# Patient Record
Sex: FEMALE | Race: BLACK OR AFRICAN AMERICAN
[De-identification: names, ages, dates, MRNs, and addresses within clinical notes are randomized per-mention and may not be internally consistent; named-entity substitution may affect disease eponyms.]

---

## 2019-04-10 ENCOUNTER — HOSPITAL ENCOUNTER (EMERGENCY)
Dept: HOSPITAL 91 - FTE | Age: 19
Discharge: HOME | End: 2019-04-10
Payer: MEDICAID

## 2019-04-10 ENCOUNTER — HOSPITAL ENCOUNTER (EMERGENCY)
Dept: HOSPITAL 10 - FTE | Age: 19
Discharge: HOME | End: 2019-04-10
Payer: MEDICAID

## 2019-04-10 VITALS — RESPIRATION RATE: 20 BRPM | SYSTOLIC BLOOD PRESSURE: 149 MMHG | HEART RATE: 97 BPM | DIASTOLIC BLOOD PRESSURE: 70 MMHG

## 2019-04-10 VITALS
HEIGHT: 65 IN | HEIGHT: 65 IN | WEIGHT: 194.01 LBS | BODY MASS INDEX: 32.32 KG/M2 | WEIGHT: 194.01 LBS | BODY MASS INDEX: 32.32 KG/M2

## 2019-04-10 DIAGNOSIS — O23.592: Primary | ICD-10-CM

## 2019-04-10 DIAGNOSIS — N89.8: ICD-10-CM

## 2019-04-10 DIAGNOSIS — Z3A.14: ICD-10-CM

## 2019-04-10 LAB
ADD UMIC: NO
HEPATITIS B SURFACE ANTIBODY: NEGATIVE
HEPATITIS B SURFACE ANTIGEN: NEGATIVE
HEPATITIS C VIRAL ANTIBODY: NEGATIVE
HIV 1&2 ANTIBODY: NEGATIVE
RAPID PLASMA REAGIN: NONREACTIVE
UR ASCORBIC ACID: 40 MG/DL
UR BACTERIA: (no result) /HPF
UR BILIRUBIN (DIP): NEGATIVE MG/DL
UR BLOOD (DIP): NEGATIVE MG/DL
UR CLARITY: (no result)
UR COLOR: YELLOW
UR GLUCOSE (DIP): NEGATIVE MG/DL
UR KETONES (DIP): NEGATIVE MG/DL
UR LEUKOCYTE ESTERASE (DIP): NEGATIVE LEU/UL
UR MUCUS: (no result) /HPF
UR NITRITE (DIP): NEGATIVE MG/DL
UR PH (DIP): 6 (ref 5–9)
UR RBC: 2 /HPF (ref 0–5)
UR SPECIFIC GRAVITY (DIP): 1.03 (ref 1–1.03)
UR SQUAMOUS EPITHELIAL CELL: (no result) /HPF
UR TOTAL PROTEIN (DIP): NEGATIVE MG/DL
UR UROBILINOGEN (DIP): (no result) MG/DL
UR WBC: 3 /HPF (ref 0–5)

## 2019-04-10 PROCEDURE — 99284 EMERGENCY DEPT VISIT MOD MDM: CPT

## 2019-04-10 PROCEDURE — 84703 CHORIONIC GONADOTROPIN ASSAY: CPT

## 2019-04-10 PROCEDURE — 81001 URINALYSIS AUTO W/SCOPE: CPT

## 2019-04-10 PROCEDURE — 86592 SYPHILIS TEST NON-TREP QUAL: CPT

## 2019-04-10 PROCEDURE — 86706 HEP B SURFACE ANTIBODY: CPT

## 2019-04-10 PROCEDURE — 36415 COLL VENOUS BLD VENIPUNCTURE: CPT

## 2019-04-10 PROCEDURE — 87591 N.GONORRHOEAE DNA AMP PROB: CPT

## 2019-04-10 PROCEDURE — 87086 URINE CULTURE/COLONY COUNT: CPT

## 2019-04-10 PROCEDURE — 87220 TISSUE EXAM FOR FUNGI: CPT

## 2019-04-10 PROCEDURE — 81003 URINALYSIS AUTO W/O SCOPE: CPT

## 2019-04-10 PROCEDURE — 86703 HIV-1/HIV-2 1 RESULT ANTBDY: CPT

## 2019-04-10 PROCEDURE — 86803 HEPATITIS C AB TEST: CPT

## 2019-04-10 PROCEDURE — 81025 URINE PREGNANCY TEST: CPT

## 2019-04-10 PROCEDURE — 87340 HEPATITIS B SURFACE AG IA: CPT

## 2019-04-10 NOTE — ERD
ER Documentation


Chief Complaint


Chief Complaint





VAGINAL ITCHING AND DISCHARGE X 1 WEEK (14 weeks pregnant)





HPI


This is a 18-year-old G1 female at 14 weeks gestation who presents to the ED 


complaining of vaginal itching and discharge.  Patient states she was recently 


treated for BV by her midwife with metronidazole.  Patient states she continued 


to have some itching and discharge and has been using clotrimazole cream for 


this.  She states her symptoms are improved.  She is also requesting STD 


screening.  She denies any recent STD exposures.  Denies any urinary symptoms.  


Denies any abdominal pain or vaginal discharge or vaginal bleeding.  No other 


complaints.





ROS


All systems reviewed and are negative except as per history of present illness.





Allergies


Allergies:  


Coded Allergies:  


     No Known Allergy (Unverified , 4/10/19)





PMhx/Soc


Medical and Surgical Hx:  pt denies Medical Hx, pt denies Surgical Hx


Hx Alcohol Use:  No


Hx Substance Use:  No


Hx Tobacco Use:  No


Smoking Status:  Never smoker





Physical Exam


Vitals





Vital Signs


  Date      Temp  Pulse  Resp  B/P (MAP)   Pulse Ox  O2          O2 Flow    FiO2


Time                                                 Delivery    Rate


   4/10/19  97.3     97    20      149/70        99


     00:39                           (96)





Physical Exam


Const:   No acute distress


Head:   Atraumatic 


Eyes:    Normal Conjunctiva


ENT:    Normal External Ears, Nose and Mouth.


Neck:               Full range of motion. No meningismus.


Resp:   Clear to auscultation bilaterally


Cardio:   Regular rate and rhythm, no murmurs


Abd:    Soft, non tender, non distended. Normal bowel sounds


 Pelvic Exam: Chaperone present


 Abdomen:      Nontender


 External Genitalia:   Normal Skin


 Speculum:      Normal vaginal mucosa, + white cream in the vaginal vault, 


likely related to her clotrimazole cream and not discharge.


 Bimanual:       No adnexal masses or tenderness, No CMT


Skin:   No petechiae or rashes


Back:   No midline or flank tenderness


Ext:    No cyanosis, or edema


Neur:   Awake and alert


Psych:    Normal Mood and Affect


Results 24 hrs





Laboratory Tests


 Test
                            4/10/19
03:31    4/10/19
03:32  4/10/19
03:53


 Hepatitis B Surface Antigen      NEGATIVE


 Hepatitis B Surface Antibody     NEGATIVE


 Hepatitis C Antibody             NEGATIVE


 HIV (1&2) Antibody               NEGATIVE


 Urine Color                                     YELLOW


 Urine Clarity
                   
              SLIGHTLY
CLOUDY  



 Urine pH                                                   6.0


 Urine Specific Gravity                                   1.028


 Urine Ketones                                   NEGATIVE mg/dL


 Urine Nitrite                                   NEGATIVE mg/dL


 Urine Bilirubin                                 NEGATIVE mg/dL


 Urine Urobilinogen                                    2+ mg/dL


 Urine Leukocyte Esterase
        
              NEGATIVE
Alfonzo/ul  



 Urine Microscopic RBC                                   2 /HPF


 Urine Microscopic WBC                                   3 /HPF


 Urine Squamous Epithelial
Cells  
              FEW /HPF 
       



 Urine Bacteria                                  FEW /HPF


 Urine Mucus                                     FEW /HPF


 Urine Hemoglobin                                NEGATIVE mg/dL


 Urine Glucose                                   NEGATIVE mg/dL


 Urine Total Protein                             NEGATIVE mg/dl


 Urine Pregnancy Test                            POSITIVE


 POC Beta HCG, Qualitative                                        POSITIVE








Procedures/MDM


LABS & DIAGNOSTIC IMAGING:


Wet mount:   Negative.


Urine:   no e/o acute infection or hematuria


Ucx:   pending


RPR:   pending


HIV:   pending


Hep B   pending


Hep C:   pending








MEDICAL DECISION MAKIN-year-old  female at 14 weeks gestation presents with vaginal itching and


discharge.  Patient has been using clotrimazole cream.  She was recently treated


for BV.  Pelvic exam shows evidence of her clotrimazole cream however no 


discharge.  Wet mount as above is negative.  UA without any infection.  Patient 


also requesting STD screening.  RPR, HIV, hep B, hep C blood drawn and pending. 


Told patient that she can call in 1 week for results and copies of these labs.  


Will not treat prophylactically for STDs as patient has not had any recent 


exposures.  Patient "just wanted to be sure" she was clean. Patient is not 


having any vaginal bleeding.  I have low suspicion for  threatened , 


incomplete/complete , missed , ectopic pregnancy or any other 


emergent process.  Recommended follow-up with her midwife or OB/GYN in 2 days.  


Strict return precautions discussed.








PRESCRIPTIONS: None








SPECIALIST FOLLOW UP RECOMMENDED: None


Patient has been advised to follow up with primary care in 1-2 days.





Departure


Diagnosis:  


   Primary Impression:  


   Female genital symptoms


Condition:  Stable


Patient Instructions:  If You Think You Have an STD, Vaginal Infection: 


Bacterial Vaginosis


Referrals:  


OB/GYN REFERRAL LIST


TRACE CARR MD


[address] [facility] 


[address] [place] [address] [phone] OFFICE FAX [phone]





KASSI VAZQUEZ


[address] [place] [address] [phone]





DR. CONWAY Lepanto


[address] [place], [place] [id number] [phone]





[facility]


[address] [facility], [address], [place] [address]


[phone]





BRIONNA GRAVESRO


[address] [facility], [place] [address]


[phone]





CLINICA Cadogan


[address] [place] [address]


[phone] [address] [facility] [address]


[phone]  [phone]





DHRUV GARCIA


[address], [facility] [address]


[phone]





DR PEREIRA, DANNIE


[address] [facility]. [address], [facility] [address]


[phone]





DR ALBERTS, [facility]


[address] [place], [place] [id number] [phone]





[facility]


Hours: 8:00 am - 5:00 pm





Additional Instructions:  


Your wet mount here was negative for any fungal or bacterial infection.  You can


continue using the clotrimazole cream if is not helping her symptoms.  Follow-up


with your midwife or OB/GYN in 2 days.  Please call the hospital 1 week for 


results and copies of your STD screening.  Return here for any new or worsening 


symptoms.











LASHAE GOMEZ PA-C     Apr 10, 2019 05:34

## 2019-04-12 LAB — LABORATORY COMMENT REPORT: (no result)
